# Patient Record
Sex: FEMALE | Race: WHITE | NOT HISPANIC OR LATINO | Employment: FULL TIME | URBAN - METROPOLITAN AREA
[De-identification: names, ages, dates, MRNs, and addresses within clinical notes are randomized per-mention and may not be internally consistent; named-entity substitution may affect disease eponyms.]

---

## 2017-02-25 ENCOUNTER — ALLSCRIPTS OFFICE VISIT (OUTPATIENT)
Dept: OTHER | Facility: OTHER | Age: 42
End: 2017-02-25

## 2017-02-25 ENCOUNTER — HOSPITAL ENCOUNTER (OUTPATIENT)
Dept: RADIOLOGY | Facility: CLINIC | Age: 42
Discharge: HOME | End: 2017-02-25
Payer: COMMERCIAL

## 2017-02-25 DIAGNOSIS — M25.579 PAIN IN ANKLE: ICD-10-CM

## 2017-02-25 PROCEDURE — 73610 X-RAY EXAM OF ANKLE: CPT

## 2017-09-14 ENCOUNTER — GENERIC CONVERSION - ENCOUNTER (OUTPATIENT)
Dept: OTHER | Facility: OTHER | Age: 42
End: 2017-09-14

## 2017-10-24 ENCOUNTER — GENERIC CONVERSION - ENCOUNTER (OUTPATIENT)
Dept: OTHER | Facility: OTHER | Age: 42
End: 2017-10-24

## 2018-01-13 VITALS
SYSTOLIC BLOOD PRESSURE: 118 MMHG | RESPIRATION RATE: 16 BRPM | HEIGHT: 63 IN | WEIGHT: 140 LBS | TEMPERATURE: 98.8 F | DIASTOLIC BLOOD PRESSURE: 70 MMHG | BODY MASS INDEX: 24.8 KG/M2 | HEART RATE: 78 BPM

## 2018-01-22 VITALS
HEART RATE: 62 BPM | TEMPERATURE: 98.4 F | DIASTOLIC BLOOD PRESSURE: 72 MMHG | OXYGEN SATURATION: 100 % | RESPIRATION RATE: 16 BRPM | SYSTOLIC BLOOD PRESSURE: 126 MMHG

## 2018-01-22 VITALS
SYSTOLIC BLOOD PRESSURE: 108 MMHG | TEMPERATURE: 98.8 F | OXYGEN SATURATION: 100 % | HEART RATE: 67 BPM | DIASTOLIC BLOOD PRESSURE: 70 MMHG | RESPIRATION RATE: 14 BRPM

## 2018-03-13 ENCOUNTER — OFFICE VISIT (OUTPATIENT)
Dept: URGENT CARE | Facility: CLINIC | Age: 43
End: 2018-03-13
Payer: COMMERCIAL

## 2018-03-13 VITALS
RESPIRATION RATE: 20 BRPM | DIASTOLIC BLOOD PRESSURE: 60 MMHG | HEART RATE: 69 BPM | OXYGEN SATURATION: 100 % | BODY MASS INDEX: 26.4 KG/M2 | HEIGHT: 63 IN | TEMPERATURE: 98.6 F | SYSTOLIC BLOOD PRESSURE: 128 MMHG | WEIGHT: 149 LBS

## 2018-03-13 DIAGNOSIS — J01.11 ACUTE RECURRENT FRONTAL SINUSITIS: Primary | ICD-10-CM

## 2018-03-13 PROCEDURE — 99213 OFFICE O/P EST LOW 20 MIN: CPT | Performed by: PHYSICIAN ASSISTANT

## 2018-03-13 RX ORDER — ESOMEPRAZOLE MAGNESIUM 40 MG/1
CAPSULE, DELAYED RELEASE ORAL
COMMUNITY
End: 2019-03-19 | Stop reason: SDUPTHER

## 2018-03-13 RX ORDER — OLOPATADINE HYDROCHLORIDE 665 UG/1
SPRAY NASAL
COMMUNITY

## 2018-03-13 RX ORDER — ATENOLOL 50 MG/1
TABLET ORAL
COMMUNITY
End: 2018-09-03 | Stop reason: SDUPTHER

## 2018-03-13 RX ORDER — AMOXICILLIN AND CLAVULANATE POTASSIUM 875; 125 MG/1; MG/1
1 TABLET, FILM COATED ORAL EVERY 12 HOURS SCHEDULED
Qty: 14 TABLET | Refills: 0 | Status: SHIPPED | OUTPATIENT
Start: 2018-03-13 | End: 2018-03-20

## 2018-03-13 NOTE — PATIENT INSTRUCTIONS
Sinusitis   AMBULATORY CARE:   Sinusitis  is inflammation or infection of your sinuses  It is most often caused by a virus  Acute sinusitis may last up to 12 weeks  Chronic sinusitis lasts longer than 12 weeks  Recurrent sinusitis means you have 4 or more times in 1 year  Common symptoms include the following:   · Fever    · Pain, pressure, redness, or swelling around the forehead, cheeks, or eyes    · Thick yellow or green discharge from your nose    · Tenderness when you touch your face over your sinuses    · Dry cough that happens mostly at night or when you lie down    · Headache and face pain that is worse when you lean forward    · Tooth pain, or pain when you chew  Seek care immediately if:   · Your eye and eyelid are red, swollen, and painful  · You cannot open your eye  · You have vision changes, such as double vision  · Your eyeball bulges out or you cannot move your eye  · You are more sleepy than normal, or you notice changes in your ability to think, move, or talk  · You have a stiff neck, a fever, or a bad headache  · You have swelling of your forehead or scalp  Contact your healthcare provider if:   · Your symptoms do not improve after 3 days  · Your symptoms do not go away after 10 days  · You have nausea and are vomiting  · Your nose is bleeding  · You have questions or concerns about your condition or care  Treatment for sinusitis:  Your symptoms may go away on their own  Your healthcare provider may recommend watchful waiting for up to 10 days before starting antibiotics  You may  need any of the following:  · Acetaminophen  decreases pain and fever  It is available without a doctor's order  Ask how much to take and how often to take it  Follow directions  Read the labels of all other medicines you are using to see if they also contain acetaminophen, or ask your doctor or pharmacist  Acetaminophen can cause liver damage if not taken correctly   Do not use more than 4 grams (4,000 milligrams) total of acetaminophen in one day  · NSAIDs , such as ibuprofen, help decrease swelling, pain, and fever  This medicine is available with or without a doctor's order  NSAIDs can cause stomach bleeding or kidney problems in certain people  If you take blood thinner medicine, always ask your healthcare provider if NSAIDs are safe for you  Always read the medicine label and follow directions  · Nasal steroid sprays  may help decrease inflammation in your nose and sinuses  · Decongestants  help reduce swelling and drain mucus in the nose and sinuses  They may help you breathe easier  · Antihistamines  help dry mucus in the nose and relieve sneezing  · Antibiotics  help treat or prevent a bacterial infection  · Take your medicine as directed  Contact your healthcare provider if you think your medicine is not helping or if you have side effects  Tell him or her if you are allergic to any medicine  Keep a list of the medicines, vitamins, and herbs you take  Include the amounts, and when and why you take them  Bring the list or the pill bottles to follow-up visits  Carry your medicine list with you in case of an emergency  Self-care:   · Rinse your sinuses  Use a sinus rinse device to rinse your nasal passages with a saline (salt water) solution or distilled water  Do not use tap water  This will help thin the mucus in your nose and rinse away pollen and dirt  It will also help reduce swelling so you can breathe normally  Ask your healthcare provider how often to do this  · Breathe in steam   Heat a bowl of water until you see steam  Lean over the bowl and make a tent over your head with a large towel  Breathe deeply for about 20 minutes  Be careful not to get too close to the steam or burn yourself  Do this 3 times a day  You can also breathe deeply when you take a hot shower  · Sleep with your head elevated    Place an extra pillow under your head before you go to sleep to help your sinuses drain  · Drink liquids as directed  Ask your healthcare provider how much liquid to drink each day and which liquids are best for you  Liquids will thin the mucus in your nose and help it drain  Avoid drinks that contain alcohol or caffeine  · Do not smoke, and avoid secondhand smoke  Nicotine and other chemicals in cigarettes and cigars can make your symptoms worse  Ask your healthcare provider for information if you currently smoke and need help to quit  E-cigarettes or smokeless tobacco still contain nicotine  Talk to your healthcare provider before you use these products  Prevent the spread of germs that cause sinusitis:  Wash your hands often with soap and water  Wash your hands after you use the bathroom, change a child's diaper, or sneeze  Wash your hands before you prepare or eat food  Follow up with your healthcare provider as directed: You may be referred to an ear, nose, and throat specialist  Write down your questions so you remember to ask them during your visits  © 2017 [employer]  Information is for End User's use only and may not be sold, redistributed or otherwise used for commercial purposes  All illustrations and images included in CareNotes® are the copyrighted property of [employer]  or [address]  The above information is an  only  It is not intended as medical advice for individual conditions or treatments  Talk to your doctor, nurse or pharmacist before following any medical regimen to see if it is safe and effective for you  Acute Sinusitis:   -Due to the severity of the patients symptoms and her hx of recurrent sinusitis will prescribe Augmentin 875mg taken as prescribed  -Continue taking Nasacort daily   -Stay well hydrated and rest  You can use a humidifier next to your bed  Take steam showers  -Take Advil or Tylenol for fever or pain  -You can take OTC Mucinex   You may try nasal rinses     -If your symptoms worsen or change follow up with your PCP immediately

## 2018-03-13 NOTE — PROGRESS NOTES
[facility]        NAME: Tristan Haney is a 36 y o  female  : 1975    MRN: [id number]  DATE: 2018  TIME: 4:48 PM    Assessment and Plan   Acute recurrent frontal sinusitis [J01 11]  1  Acute recurrent frontal sinusitis  amoxicillin-clavulanate (AUGMENTIN) 875-125 mg per tablet         Patient Instructions   Acute Sinusitis:   -Due to the severity of the patients symptoms and her hx of recurrent sinusitis will prescribe Augmentin 875mg taken as prescribed  -Continue taking Nasacort daily   -Stay well hydrated and rest  You can use a humidifier next to your bed  Take steam showers  -Take Advil or Tylenol for fever or pain  -You can take OTC Mucinex  You may try nasal rinses  -If your symptoms worsen or change follow up with your PCP immediately     Follow up with PCP in 3-5 days  Proceed to  ER if symptoms worsen  Chief Complaint     Chief Complaint   Patient presents with    Cold Like Symptoms     began 3 days ago: headache, congestion, post nasal drip   green mucous from nose  ice and ibuprofen w/o relief  does take allergy meds         History of Present Illness       The patient presents today for a three day hx of headache, PND, nasal congestion productive of a green mucous  She states that her symptoms started with sinus pressure and pain  She is now having severe left sided facial pain and pressure  She has been applying ice to the sinuses and has been taking Advil with no relief  She also states that she takes OTC allergy medications  She denies fever or chills  Review of Systems   Review of Systems   Constitutional: Negative for chills, diaphoresis, fatigue and fever  HENT: Positive for congestion, postnasal drip, rhinorrhea, sinus pain and sinus pressure  Negative for ear discharge, ear pain, sore throat, tinnitus, trouble swallowing and voice change  Respiratory: Negative for cough, shortness of breath and wheezing      Cardiovascular: Negative for chest pain and palpitations  Allergic/Immunologic: Positive for environmental allergies  Neurological: Positive for headaches  Negative for dizziness and light-headedness  Hematological: Negative for adenopathy  Current Medications       Current Outpatient Prescriptions:     atenolol (TENORMIN) 50 mg tablet, Take by mouth, Disp: , Rfl:     budesonide (PULMICORT FLEXHALER) 180 MCG/ACT inhaler, Inhale, Disp: , Rfl:     esomeprazole (NEXIUM) 40 MG capsule, Take by mouth, Disp: , Rfl:     Olopatadine HCl (PATANASE) 0 6 % SOLN, into each nostril, Disp: , Rfl:     amoxicillin-clavulanate (AUGMENTIN) 875-125 mg per tablet, Take 1 tablet by mouth every 12 (twelve) hours for 7 days, Disp: 14 tablet, Rfl: 0    Current Allergies     Allergies as of 2018 - Reviewed 2018   Allergen Reaction Noted    Corn oil Other (See Comments) 2012    Isoflavones Other (See Comments) 2012    Peanut oil Other (See Comments) 2012    Latex Rash 2012            The following portions of the patient's history were reviewed and updated as appropriate: allergies, current medications, past family history, past medical history, past social history, past surgical history and problem list      Past Medical History:   Diagnosis Date    Allergic rhinitis     Asthma     Mitral valve prolapse        Past Surgical History:   Procedure Laterality Date    APPENDECTOMY       SECTION         History reviewed  No pertinent family history  Medications have been verified  Objective   /60   Pulse 69   Temp 98 6 °F (37 °C)   Resp 20   Ht 5' 3" (1 6 m)   Wt 67 6 kg (149 lb)   SpO2 100%   BMI 26 39 kg/m²        Physical Exam     Physical Exam   Constitutional: She appears well-developed and well-nourished  No distress     HENT:   Right Ear: Hearing, tympanic membrane, external ear and ear canal normal    Left Ear: Hearing, tympanic membrane, external ear and ear canal normal  Nose: Mucosal edema and rhinorrhea present  Right sinus exhibits maxillary sinus tenderness and frontal sinus tenderness  Left sinus exhibits maxillary sinus tenderness and frontal sinus tenderness  Mouth/Throat: Uvula is midline, oropharynx is clear and moist and mucous membranes are normal    Neck: Normal range of motion  Neck supple  Cardiovascular: Normal rate and regular rhythm  Exam reveals no gallop and no friction rub  No murmur heard  Pulmonary/Chest: Effort normal and breath sounds normal  No respiratory distress  She has no rales  She exhibits no tenderness  Lymphadenopathy:     She has no cervical adenopathy  Neurological: She is alert  Skin: She is not diaphoretic  Psychiatric: She has a normal mood and affect

## 2018-06-15 LAB — HBA1C MFR BLD HPLC: 5 %

## 2018-06-20 LAB — HBA1C MFR BLD HPLC: 5 %

## 2018-07-11 ENCOUNTER — OFFICE VISIT (OUTPATIENT)
Dept: URGENT CARE | Facility: CLINIC | Age: 43
End: 2018-07-11
Payer: COMMERCIAL

## 2018-07-11 VITALS
RESPIRATION RATE: 16 BRPM | HEIGHT: 62 IN | DIASTOLIC BLOOD PRESSURE: 64 MMHG | OXYGEN SATURATION: 100 % | HEART RATE: 62 BPM | TEMPERATURE: 97.8 F | WEIGHT: 144.6 LBS | SYSTOLIC BLOOD PRESSURE: 100 MMHG | BODY MASS INDEX: 26.61 KG/M2

## 2018-07-11 DIAGNOSIS — H60.502 ACUTE OTITIS EXTERNA OF LEFT EAR, UNSPECIFIED TYPE: Primary | ICD-10-CM

## 2018-07-11 PROCEDURE — 99213 OFFICE O/P EST LOW 20 MIN: CPT | Performed by: FAMILY MEDICINE

## 2018-07-11 RX ORDER — MONTELUKAST SODIUM 10 MG/1
TABLET ORAL
COMMUNITY
Start: 2018-03-15 | End: 2019-07-11 | Stop reason: ALTCHOICE

## 2018-07-11 RX ORDER — LEVOTHYROXINE SODIUM 88 MCG
88 TABLET ORAL DAILY
Refills: 0 | COMMUNITY
Start: 2018-05-15

## 2018-07-11 NOTE — PROGRESS NOTES
[facility]        NAME: Mars Colon is a 36 y o  female  : 1975    MRN: [id number]  DATE: 2018  TIME: 2:55 PM    Assessment and Plan   Acute otitis externa of left ear, unspecified type [H60 502]  1  Acute otitis externa of left ear, unspecified type  neomycin-polymyxin-hydrocortisone (CORTISPORIN) otic solution         Patient Instructions     Patient Instructions   Mild acute otitis externa of the left ear   - Cortisporin ear drops prescribed, use as directed  - take Tylenol or Motrin as needed   - no swimming until conditions resolves  - if symptoms persist despite treatment or worsen, follow up w/ pcp for re-check     Follow up with PCP in 3-5 days  Proceed to  ER if symptoms worsen  Chief Complaint     Chief Complaint   Patient presents with    Earache         History of Present Illness       35 yo female presents c/o L ear pain x 1 day  Has been swimming recently  Denies any drainage from the ear  No URI sx  No fever/chills  No headache or dizziness  No hearing loss  No ringing in ears  Feels some left sided sinus pressure  Has been using OTC ear drops w/ minimal relief  Review of Systems   Review of Systems   Constitutional: Negative  HENT:        As noted in HPI   Eyes: Negative  Respiratory: Negative  Cardiovascular: Negative  Gastrointestinal: Negative  Musculoskeletal: Negative  Skin: Negative  Neurological: Negative            Current Medications       Current Outpatient Prescriptions:     atenolol (TENORMIN) 50 mg tablet, Take by mouth, Disp: , Rfl:     budesonide (PULMICORT FLEXHALER) 180 MCG/ACT inhaler, Inhale, Disp: , Rfl:     esomeprazole (NEXIUM) 40 MG capsule, Take by mouth, Disp: , Rfl:     montelukast (SINGULAIR) 10 mg tablet, take 1 tablet by mouth every evening, Disp: , Rfl:     Olopatadine HCl (PATANASE) 0 6 % SOLN, into each nostril, Disp: , Rfl:     SYNTHROID 88 MCG tablet, Take 88 mcg by mouth daily, Disp: , Rfl: 0    neomycin-polymyxin-hydrocortisone (CORTISPORIN) otic solution, Administer 4 drops into the left ear every 8 (eight) hours for 7 days, Disp: 10 mL, Rfl: 0    Current Allergies     Allergies as of 2018 - Reviewed 2018   Allergen Reaction Noted    Corn oil Other (See Comments) 2012    Isoflavones Other (See Comments) 2012    Latex Rash 2012    Peanut oil Other (See Comments) 2012            The following portions of the patient's history were reviewed and updated as appropriate: allergies, current medications, past family history, past medical history, past social history, past surgical history and problem list      Past Medical History:   Diagnosis Date    Allergic rhinitis     Asthma     Mitral valve prolapse        Past Surgical History:   Procedure Laterality Date    APPENDECTOMY       SECTION         Family History   Problem Relation Age of Onset    Emphysema Mother     Diabetes Father     Cancer Father          Medications have been verified  Objective   /64 (BP Location: Right arm, Patient Position: Sitting, Cuff Size: Standard)   Pulse 62   Temp 97 8 °F (36 6 °C) (Temporal)   Resp 16   Ht 5' 2" (1 575 m)   Wt 65 6 kg (144 lb 9 6 oz)   SpO2 100%   BMI 26 45 kg/m²        Physical Exam     Physical Exam   Constitutional: She is oriented to person, place, and time  Vital signs are normal  She appears well-developed and well-nourished  She is active and cooperative  Non-toxic appearance  She does not have a sickly appearance  She does not appear ill  No distress  HENT:   Head: Normocephalic and atraumatic  Right Ear: Hearing, tympanic membrane, external ear and ear canal normal    Left Ear: Tympanic membrane normal  There is tenderness  No drainage or swelling  No mastoid tenderness     Nose: Nose normal    Mouth/Throat: Uvula is midline, oropharynx is clear and moist and mucous membranes are normal    Left TM within normal limits  Left ear canal is mildly erythematous and tender  No drainage or swelling noted  Eyes: Conjunctivae and EOM are normal  Pupils are equal, round, and reactive to light  Neck: Normal range of motion  Neck supple  Lymphadenopathy:     She has no cervical adenopathy  Neurological: She is alert and oriented to person, place, and time  Skin: She is not diaphoretic  Psychiatric: She has a normal mood and affect  Her behavior is normal  Judgment and thought content normal    Nursing note and vitals reviewed

## 2018-07-11 NOTE — PATIENT INSTRUCTIONS
Mild acute otitis externa of the left ear   - Cortisporin ear drops prescribed, use as directed  - take Tylenol or Motrin as needed   - no swimming until conditions resolves  - if symptoms persist despite treatment or worsen, follow up w/ pcp for re-check

## 2018-09-03 ENCOUNTER — OFFICE VISIT (OUTPATIENT)
Dept: URGENT CARE | Facility: CLINIC | Age: 43
End: 2018-09-03
Payer: COMMERCIAL

## 2018-09-03 VITALS
HEART RATE: 62 BPM | SYSTOLIC BLOOD PRESSURE: 128 MMHG | BODY MASS INDEX: 27.42 KG/M2 | RESPIRATION RATE: 16 BRPM | OXYGEN SATURATION: 100 % | HEIGHT: 62 IN | DIASTOLIC BLOOD PRESSURE: 88 MMHG | WEIGHT: 149 LBS

## 2018-09-03 DIAGNOSIS — H92.01 RIGHT EAR PAIN: Primary | ICD-10-CM

## 2018-09-03 DIAGNOSIS — H91.91 HEARING LOSS OF RIGHT EAR, UNSPECIFIED HEARING LOSS TYPE: ICD-10-CM

## 2018-09-03 DIAGNOSIS — H93.11 TINNITUS OF RIGHT EAR: ICD-10-CM

## 2018-09-03 PROCEDURE — 99213 OFFICE O/P EST LOW 20 MIN: CPT | Performed by: PHYSICIAN ASSISTANT

## 2018-09-03 RX ORDER — PREDNISONE 10 MG/1
TABLET ORAL
Refills: 0 | COMMUNITY
Start: 2018-08-31 | End: 2019-07-11 | Stop reason: ALTCHOICE

## 2018-09-03 RX ORDER — ATENOLOL 25 MG/1
TABLET ORAL
Refills: 0 | COMMUNITY
Start: 2018-07-17 | End: 2019-07-11 | Stop reason: SDUPTHER

## 2018-09-03 NOTE — PROGRESS NOTES
[facility]        NAME: Mir Ferguson is a 36 y o  female  : 1975    MRN: [id number]  DATE: September 3, 2018  TIME: 12:20 PM    Assessment and Plan   Right ear pain [H92 01]  1  Right ear pain     2  Tinnitus of right ear     3  Hearing loss of right ear, unspecified hearing loss type       Patient Instructions   Follow up with your ENT tomorrow to have ear reevaluated  Continue taking the antibiotic prescribed, as directed  Take this medication with food  Take a probiotic with this medication  Proceed to the ER if symptoms worsen  Reviewed with patient no findings of infection and good wound healing on exam  She was in agreement to seek care immediately tomorrow morning  She was provided with an [employer] Card to establish care with a PCP so they may coordinate care with ENT  All questions answered  Precautions given  Chief Complaint     Chief Complaint   Patient presents with   Rachelle Aguirre     pt states she had a right ear concussion; onset 3 weeks ago; ringing in the ear; worsening ; hearing loss as per ENT;  went back to ENT on Friday, received an steroid injection into right ear; pain increased on   History of Present Illness   35 y/o female presenting with c/o right ear pain x 3 weeks  Patient states she [place] 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[place] 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  She was later seen by an ENT due to "ear concussion"  She was treated at onset with an oral steroid which did help to relieve some hearing loss and ringing in her ears, however, symptoms recurred following d/c medication  She was seen 3 days ago by same ENT where a transtympanic steroid injection was performed  The injection did somewhat relieve discomfort, however, symptoms recurred yesterday with worsened severity  Patient states she spoke with the ENT on call yesterday who advised her to begin nasal spray and a decongestant   She reported to said provider that she was already using nasal sprays and inability to take a decongestant due to MVP and subsequent palpitations with taking these medications  She was thus started on augment, which she has taken x 1 day  She is presenting today wanting to have ear looked at to ensure no worsening, rupture, or infection  Review of Systems   Review of Systems   Constitutional: Negative for appetite change, chills, diaphoresis, fatigue and fever  HENT: Negative for congestion, ear discharge, rhinorrhea and sore throat  Respiratory: Negative for cough, shortness of breath and wheezing  Cardiovascular: Negative for chest pain and palpitations  Gastrointestinal: Negative for abdominal pain, diarrhea, nausea and vomiting  Musculoskeletal: Negative for arthralgias and myalgias  Skin: Negative for color change and rash  Neurological: Negative for dizziness, light-headedness and headaches  Current Medications       Current Outpatient Prescriptions:     atenolol (TENORMIN) 25 mg tablet, take 2 tablets by mouth twice a day AT LUNCH AND BEDTIME, Disp: , Rfl: 0    esomeprazole (NEXIUM) 40 MG capsule, Take by mouth, Disp: , Rfl:     montelukast (SINGULAIR) 10 mg tablet, take 1 tablet by mouth every evening, Disp: , Rfl:     Olopatadine HCl (PATANASE) 0 6 % SOLN, into each nostril, Disp: , Rfl:     predniSONE 10 mg tablet, TAKE 4 TABLETS DAILY FOR 2 DAYS, THEN 3 DAILY FOR 2 DAYS, THEN 2    (REFER TO PRESCRIPTION NOTES)  , Disp: , Rfl: 0    SYNTHROID 88 MCG tablet, Take 88 mcg by mouth daily, Disp: , Rfl: 0    budesonide (PULMICORT FLEXHALER) 180 MCG/ACT inhaler, Inhale, Disp: , Rfl:     Current Allergies     Allergies as of 09/03/2018 - Reviewed 09/03/2018   Allergen Reaction Noted    Corn oil Other (See Comments) 12/30/2012    Isoflavones Other (See Comments) 12/30/2012    Latex Rash 12/30/2012    Peanut oil Other (See Comments) 12/30/2012            The following portions of the patient's history were reviewed and updated as appropriate: allergies, current medications, past family history, past medical history, past social history, past surgical history and problem list      Past Medical History:   Diagnosis Date    Allergic rhinitis     Asthma     Mitral valve prolapse        Past Surgical History:   Procedure Laterality Date    APPENDECTOMY       SECTION         Family History   Problem Relation Age of Onset    Emphysema Mother     Diabetes Father     Cancer Father          Medications have been verified  Objective   /88   Pulse 62   Resp 16   Ht 5' 2" (1 575 m)   Wt 67 6 kg (149 lb)   LMP 2018 (Exact Date)   SpO2 100%   Breastfeeding? No   BMI 27 25 kg/m²        Physical Exam     Physical Exam   Constitutional: She is oriented to person, place, and time  She appears well-developed and well-nourished  She does not appear ill  No distress  HENT:   Head: Normocephalic and atraumatic  Right Ear: External ear and ear canal normal  Decreased hearing is noted  Left Ear: Hearing, tympanic membrane, external ear and ear canal normal    Nose: Nose normal  No mucosal edema or rhinorrhea  Mouth/Throat: Uvula is midline, oropharynx is clear and moist and mucous membranes are normal  No oropharyngeal exudate, posterior oropharyngeal edema or posterior oropharyngeal erythema  Signs of healing consistent with recent transtympanic steroid injection visualized at 11 oclock region of the right TM  Eyes: Conjunctivae are normal    Neck: Neck supple  Cardiovascular: Normal rate, regular rhythm and normal heart sounds  Pulmonary/Chest: Effort normal and breath sounds normal  No respiratory distress  She has no decreased breath sounds  She has no wheezes  She has no rhonchi  She has no rales  Lymphadenopathy:     She has no cervical adenopathy  Neurological: She is alert and oriented to person, place, and time  No cranial nerve deficit  She exhibits normal muscle tone  Coordination normal    Skin: Skin is warm and dry  No rash noted  She is not diaphoretic  Psychiatric: She has a normal mood and affect  Her behavior is normal    Nursing note and vitals reviewed

## 2018-09-03 NOTE — PATIENT INSTRUCTIONS
Follow up with your ENT tomorrow to have ear reevaluated  Continue taking the antibiotic prescribed, as directed  Take this medication with food  Take a probiotic with this medication  Proceed to the ER if symptoms worsen

## 2019-03-19 DIAGNOSIS — K21.9 GASTROESOPHAGEAL REFLUX DISEASE, ESOPHAGITIS PRESENCE NOT SPECIFIED: Primary | ICD-10-CM

## 2019-03-20 RX ORDER — ESOMEPRAZOLE MAGNESIUM 40 MG/1
CAPSULE, DELAYED RELEASE ORAL
Qty: 90 CAPSULE | Refills: 0 | Status: SHIPPED | OUTPATIENT
Start: 2019-03-20 | End: 2019-06-12 | Stop reason: SDUPTHER

## 2019-06-12 DIAGNOSIS — K21.9 GASTROESOPHAGEAL REFLUX DISEASE, ESOPHAGITIS PRESENCE NOT SPECIFIED: ICD-10-CM

## 2019-06-12 RX ORDER — ESOMEPRAZOLE MAGNESIUM 40 MG/1
CAPSULE, DELAYED RELEASE ORAL
Qty: 90 CAPSULE | Refills: 0 | Status: SHIPPED | OUTPATIENT
Start: 2019-06-12 | End: 2019-09-10 | Stop reason: SDUPTHER

## 2019-07-11 ENCOUNTER — OFFICE VISIT (OUTPATIENT)
Dept: URGENT CARE | Facility: CLINIC | Age: 44
End: 2019-07-11
Payer: COMMERCIAL

## 2019-07-11 VITALS
BODY MASS INDEX: 27.25 KG/M2 | TEMPERATURE: 98.9 F | DIASTOLIC BLOOD PRESSURE: 88 MMHG | OXYGEN SATURATION: 100 % | WEIGHT: 149 LBS | SYSTOLIC BLOOD PRESSURE: 120 MMHG | RESPIRATION RATE: 16 BRPM | HEART RATE: 69 BPM

## 2019-07-11 DIAGNOSIS — R39.9 UTI SYMPTOMS: Primary | ICD-10-CM

## 2019-07-11 LAB
SL AMB  POCT GLUCOSE, UA: ABNORMAL
SL AMB LEUKOCYTE ESTERASE,UA: ABNORMAL
SL AMB POCT BILIRUBIN,UA: ABNORMAL
SL AMB POCT BLOOD,UA: ABNORMAL
SL AMB POCT CLARITY,UA: ABNORMAL
SL AMB POCT COLOR,UA: ABNORMAL
SL AMB POCT KETONES,UA: ABNORMAL
SL AMB POCT NITRITE,UA: ABNORMAL
SL AMB POCT PH,UA: ABNORMAL
SL AMB POCT SPECIFIC GRAVITY,UA: ABNORMAL
SL AMB POCT URINE PROTEIN: ABNORMAL
SL AMB POCT UROBILINOGEN: ABNORMAL

## 2019-07-11 PROCEDURE — 99213 OFFICE O/P EST LOW 20 MIN: CPT | Performed by: PHYSICIAN ASSISTANT

## 2019-07-11 PROCEDURE — 87086 URINE CULTURE/COLONY COUNT: CPT | Performed by: PHYSICIAN ASSISTANT

## 2019-07-11 PROCEDURE — 81002 URINALYSIS NONAUTO W/O SCOPE: CPT | Performed by: PHYSICIAN ASSISTANT

## 2019-07-11 RX ORDER — ATENOLOL 25 MG/1
50 TABLET ORAL
COMMUNITY
Start: 2018-11-27

## 2019-07-11 RX ORDER — NITROFURANTOIN 25; 75 MG/1; MG/1
100 CAPSULE ORAL 2 TIMES DAILY
Qty: 14 CAPSULE | Refills: 0 | Status: SHIPPED | OUTPATIENT
Start: 2019-07-11 | End: 2019-07-29

## 2019-07-11 NOTE — PATIENT INSTRUCTIONS
Urinary Tract Infection in Women   WHAT YOU NEED TO KNOW:   A urinary tract infection (UTI) is caused by bacteria that get inside your urinary tract  Most bacteria that enter your urinary tract come out when you urinate  If the bacteria stay in your urinary tract, you may get an infection  Your urinary tract includes your kidneys, ureters, bladder, and urethra  Urine is made in your kidneys, and it flows from the ureters to the bladder  Urine leaves the bladder through the urethra  A UTI is more common in your lower urinary tract, which includes your bladder and urethra  DISCHARGE INSTRUCTIONS:   Return to the emergency department if:   · You are urinating very little or not at all  · You have a high fever with shaking chills  · You have side or back pain that gets worse  Contact your healthcare provider if:   · You have a fever  · You do not feel better after 2 days of taking antibiotics  · You are vomiting  · You have questions or concerns about your condition or care  Medicines:   · Antibiotics  help fight a bacterial infection  · Medicines  may be given to decrease pain and burning when you urinate  They will also help decrease the feeling that you need to urinate often  These medicines will make your urine orange or red  · Take your medicine as directed  Contact your healthcare provider if you think your medicine is not helping or if you have side effects  Tell him or her if you are allergic to any medicine  Keep a list of the medicines, vitamins, and herbs you take  Include the amounts, and when and why you take them  Bring the list or the pill bottles to follow-up visits  Carry your medicine list with you in case of an emergency  Follow up with your healthcare provider as directed:  Write down your questions so you remember to ask them during your visits  Prevent another UTI:   · Empty your bladder often  Urinate and empty your bladder as soon as you feel the need   Do not hold your urine for long periods of time  · Wipe from front to back after you urinate or have a bowel movement  This will help prevent germs from getting into your urinary tract through your urethra  · Drink liquids as directed  Ask how much liquid to drink each day and which liquids are best for you  You may need to drink more liquids than usual to help flush out the bacteria  Do not drink alcohol, caffeine, or citrus juices  These can irritate your bladder and increase your symptoms  Your healthcare provider may recommend cranberry juice to help prevent a UTI  · Urinate after you have sex  This can help flush out bacteria passed during sex  · Do not douche or use feminine deodorants  These can change the chemical balance in your vagina  · Change sanitary pads or tampons often  This will help prevent germs from getting into your urinary tract  · Do pelvic muscle exercises often  Pelvic muscle exercises may help you start and stop urinating  Strong pelvic muscles may help you empty your bladder easier  Squeeze these muscles tightly for 5 seconds like you are trying to hold back urine  Then relax for 5 seconds  Gradually work up to squeezing for 10 seconds  Do 3 sets of 15 repetitions a day, or as directed  © 2017 [employer]  Information is for End User's use only and may not be sold, redistributed or otherwise used for commercial purposes  All illustrations and images included in CareNotes® are the copyrighted property of [employer]  or [employer]  The above information is an  only  It is not intended as medical advice for individual conditions or treatments  Talk to your doctor, nurse or pharmacist before following any medical regimen to see if it is safe and effective for you  UTI symptoms:   -The Urinalysis was unable to be read due to azo  Will send out for culture  Will treat empirically with Macrobid 100mg taken as directed   Take with food and a probiotic  Call in the next 48 hours for your results    -Stay very well hydrated and push fluids  You can drink low sugar cranberry juice diluted with water    -You can take Uricalm or Azo for your symptoms not to exceed 48 hours  -If your symptoms worsen or persist follow up with your PCP immediately for a recheck

## 2019-07-11 NOTE — PROGRESS NOTES
[facility]        NAME: Jun Goldman is a 39 y o  female  : 1975    MRN: [id number]  DATE: 2019  TIME: 9:16 AM    Assessment and Plan   UTI symptoms [R39 9]  1  UTI symptoms  POCT urine dip         Patient Instructions   UTI symptoms:   -The Urinalysis was unable to be read due to azo  Will send out for culture  Will treat empirically with Macrobid 100mg taken as directed  Take with food and a probiotic  Call in the next 48 hours for your results    -Stay very well hydrated and push fluids  You can drink low sugar cranberry juice diluted with water    -You can take Uricalm or Azo for your symptoms not to exceed 48 hours  -If your symptoms worsen or persist follow up with your PCP immediately for a recheck  Follow up with PCP in 3-5 days  Proceed to  ER if symptoms worsen  Chief Complaint     Chief Complaint   Patient presents with    Possible UTI     pt presnets with painful urination, pressure, states started 2-3 days ago; took AZO last night         History of Present Illness       The patient presents today for a three day hx of dysuria, suprapubic pressure and urgency/frequency  She states that she took Azo before bed last night which did relieve the pain  She denies fever, chills, hematuria, abdominal pain, flank pain  She states that her last menses was 2019, she states that her menses have been normal        Review of Systems   Review of Systems   Constitutional: Negative for chills, fatigue and fever  Respiratory: Negative for chest tightness and shortness of breath  Gastrointestinal: Negative for abdominal distention, abdominal pain, blood in stool, constipation, diarrhea, nausea and vomiting  Endocrine: Negative for polyuria  Genitourinary: Positive for dysuria, frequency and urgency   Negative for decreased urine volume, difficulty urinating, flank pain, genital sores, hematuria, menstrual problem, pelvic pain, vaginal bleeding, vaginal discharge and vaginal pain  Skin: Negative for rash  Allergic/Immunologic: Negative for immunocompromised state  Neurological: Negative for dizziness, weakness, light-headedness, numbness and headaches  Hematological: Negative for adenopathy  Does not bruise/bleed easily  Current Medications       Current Outpatient Medications:     atenolol (TENORMIN) 25 mg tablet, Take 50 mg by mouth, Disp: , Rfl:     esomeprazole (NexIUM) 40 MG capsule, take 1 capsule by mouth once daily, Disp: 90 capsule, Rfl: 0    Olopatadine HCl (PATANASE) 0 6 % SOLN, into each nostril, Disp: , Rfl:     SYNTHROID 88 MCG tablet, Take 88 mcg by mouth daily, Disp: , Rfl: 0    Current Allergies     Allergies as of 2019 - Reviewed 2019   Allergen Reaction Noted    Corn oil Other (See Comments) 2012    Isoflavones Other (See Comments) 2012    Latex Rash 2012    Peanut oil Other (See Comments) 2012            The following portions of the patient's history were reviewed and updated as appropriate: allergies, current medications, past family history, past medical history, past social history, past surgical history and problem list      Past Medical History:   Diagnosis Date    Allergic rhinitis     Asthma     Mitral valve prolapse        Past Surgical History:   Procedure Laterality Date    APPENDECTOMY       SECTION         Family History   Problem Relation Age of Onset    Emphysema Mother     Diabetes Father     Cancer Father          Medications have been verified  Objective   /88   Pulse 69   Temp 98 9 °F (37 2 °C)   Resp 16   Wt 67 6 kg (149 lb)   LMP 2019   SpO2 100%   Breastfeeding? No   BMI 27 25 kg/m²        Physical Exam     Physical Exam   Constitutional: She appears well-developed and well-nourished  No distress  Cardiovascular: Normal rate, regular rhythm and normal heart sounds     Pulmonary/Chest: Effort normal and breath sounds normal    Abdominal: Soft  Normal appearance and bowel sounds are normal  She exhibits no distension  There is no hepatosplenomegaly  There is no tenderness  There is no rigidity, no rebound, no guarding, no CVA tenderness, no tenderness at McBurney's point and negative Barker's sign  No hernia  Skin: She is not diaphoretic  Psychiatric: She has a normal mood and affect  Her behavior is normal    Nursing note and vitals reviewed

## 2019-07-12 LAB — BACTERIA UR CULT: NORMAL

## 2019-07-26 RX ORDER — ATENOLOL 25 MG/1
TABLET ORAL
COMMUNITY
Start: 2019-07-22 | End: 2019-07-29

## 2019-07-29 ENCOUNTER — OFFICE VISIT (OUTPATIENT)
Dept: FAMILY MEDICINE CLINIC | Facility: CLINIC | Age: 44
End: 2019-07-29
Payer: COMMERCIAL

## 2019-07-29 VITALS
WEIGHT: 150 LBS | RESPIRATION RATE: 16 BRPM | TEMPERATURE: 98.4 F | DIASTOLIC BLOOD PRESSURE: 78 MMHG | BODY MASS INDEX: 27.6 KG/M2 | HEIGHT: 62 IN | HEART RATE: 70 BPM | SYSTOLIC BLOOD PRESSURE: 130 MMHG

## 2019-07-29 DIAGNOSIS — D50.9 IRON DEFICIENCY ANEMIA, UNSPECIFIED IRON DEFICIENCY ANEMIA TYPE: ICD-10-CM

## 2019-07-29 DIAGNOSIS — I05.9 MITRAL VALVE DISORDER: ICD-10-CM

## 2019-07-29 DIAGNOSIS — E66.3 OVERWEIGHT (BMI 25.0-29.9): ICD-10-CM
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
## 2019-07-29 DIAGNOSIS — E06.3 HASHIMOTO'S THYROIDITIS: ICD-10-CM

## 2019-07-29 DIAGNOSIS — Z00.01 ENCOUNTER FOR WELL ADULT EXAM WITH ABNORMAL FINDINGS: Primary | ICD-10-CM

## 2019-07-29 DIAGNOSIS — Z23 ENCOUNTER FOR IMMUNIZATION: ICD-10-CM

## 2019-07-29 PROCEDURE — 99396 PREV VISIT EST AGE 40-64: CPT | Performed by: FAMILY MEDICINE

## 2019-07-29 PROCEDURE — 90471 IMMUNIZATION ADMIN: CPT | Performed by: FAMILY MEDICINE

## 2019-07-29 PROCEDURE — 90714 TD VACC NO PRESV 7 YRS+ IM: CPT | Performed by: FAMILY MEDICINE

## 2019-07-29 NOTE — PROGRESS NOTES
FAMILY [facility] HEALTH MAINTENANCE OFFICE VISIT  [facility]    NAME: Alex Arana  AGE: 39 y o  SEX: female  : 1975     DATE: 2019    Assessment and Plan     Problem List Items Addressed This Visit        Endocrine    Hashimoto's thyroiditis    Relevant Orders    TSH, 3rd generation with Free T4 reflex       Cardiovascular and Mediastinum    Mitral valve disorder       Other    Iron deficiency anemia    Relevant Orders    CBC and differential    Iron, TIBC and Ferritin Panel      Other Visit Diagnoses     Encounter for well adult exam with abnormal findings    -  Primary    Relevant Orders    Lipid Panel with Direct LDL reflex    HEMOGLOBIN A1C W/ EAG ESTIMATION    Encounter for immunization        Relevant Orders    TD VACCINE GREATER THAN OR EQUAL TO 6YO PRESERVATIVE FREE IM (Completed)    Overweight (BMI 25 0-29  9)        Relevant Orders    Lipid Panel with Direct LDL reflex    HEMOGLOBIN A1C W/ EAG ESTIMATION    Basic metabolic panel    Tick bite, initial encounter        Relevant Orders    Lyme Antibody Profile with reflex to WB        · Patient Counseling:   · Nutrition: Stressed importance of a well balanced diet, moderation of sodium/saturated fat, caloric balance and sufficient intake of fiber  · Exercise: Stressed the importance of regular exercise with a goal of 150 minutes per week  · Dental Health: Discussed daily flossing and brushing and regular dental visits   · Injury Prevention: Discussed Safety Belts, Safety Helmets, and Smoke Detectors  · Immunizations reviewed received Td today as pertussis can not be given as per patient due to allergies  · Discussed benefits of screening up to date  · Discussed the patient's BMI with her  The BMI is above average; BMI management plan is completed     No follow-ups on file          Chief Complaint     Chief Complaint   Patient presents with    Establish Care       History of Present Illness     HPI    Well Adult Physical   Patient here for a comprehensive physical exam  Patient has a history of Hashimotos's,  Tinnitus, and Mitral valve prolapse presents to establish care  Patient states she has followed up with specialists but not with general practice  Patient follows up with cardiology in [facility] for mitral valve disorder  Last blood work was done over one year ago which showed TSH of 1 9 wnl, A1c of 5 0, and Lipid panel wnl  Blood work also showed hemoglobin of 9 9 and currently not on any medication prescribed supplements  She was taking flinestones with iron in them but is not at this moment  Patient complains of a deer tick bite in May  She knew because she has to pull it off her right forearm  She has not noticed any rashes, arthralgias, or problems with eyes including closure  For tinnitus, patient follows up with ENT  No concerns with any chest pain  Diet and Physical Activity  Diet: well balanced diet  Weight concerns: Patient is overweight (BMI 25 0-29 9), She states she tries to eat healthy, but home situation with her daughter being anorexic is hard as she wants to be a good example and show the importance of nutrition and eating  Exercise: runs around with kids, bike riding       Depression Screen  PHQ-9 Depression Screening    PHQ-9:    Frequency of the following problems over the past two weeks:       Little interest or pleasure in doing things:  0 - not at all  Feeling down, depressed, or hopeless:  0 - not at all  PHQ-2 Score:  0          General Health  Hearing: Slighty decreased: right, tinnutis: hearing loss, r ears, f/u ENT  Vision: no vision problems and most recent eye exam >1 year  Dental: regular dental visits, brushes teeth twice daily and flosses teeth occasionally    Reproductive Health  , 2 kids, one adopted     LMP: 19  Regular menses   Last pap smear: 2019, no history of abnormal pap smears  Not sexually active      The following portions of the patient's history were reviewed and updated as appropriate: allergies, current medications, past family history, past medical history, past social history, past surgical history and problem list     Review of Systems     Review of Systems   Constitutional: Negative for appetite change and fever  HENT: Negative for ear pain and sore throat  Eyes: Negative for visual disturbance  Respiratory: Negative for shortness of breath  Cardiovascular: Negative for chest pain and leg swelling  Gastrointestinal: Negative for abdominal pain, diarrhea, nausea and vomiting  Genitourinary: Negative for difficulty urinating, flank pain, hematuria and urgency  Musculoskeletal: Negative for arthralgias  Skin: Negative for color change and rash  Neurological: Negative for dizziness, tremors, light-headedness and headaches  Psychiatric/Behavioral: Negative for agitation and behavioral problems         Past Medical History     Past Medical History:   Diagnosis Date    Allergic rhinitis     Asthma     Hypothyroidism     Resolved 2017     Mitral valve prolapse     Postoperative hypothyroidism        Past Surgical History     Past Surgical History:   Procedure Laterality Date    APPENDECTOMY       SECTION         Social History     Social History     Socioeconomic History    Marital status: /Civil Union     Spouse name: None    Number of children: None    Years of education: None    Highest education level: None   Occupational History    None   Social Needs    Financial resource strain: None    Food insecurity:     Worry: None     Inability: None    Transportation needs:     Medical: None     Non-medical: None   Tobacco Use    Smoking status: Never Smoker    Smokeless tobacco: Never Used   Substance and Sexual Activity    Alcohol use: Yes     Frequency: 2-3 times a week     Drinks per session: 1 or 2     Binge frequency: Never     Comment: social    Drug use: No    Sexual activity: None   Lifestyle    Physical activity:     Days per week: None     Minutes per session: None    Stress: None   Relationships    Social connections:     Talks on phone: None     Gets together: None     Attends [employer] service: None     Active member of club or organization: None     Attends meetings of clubs or organizations: None     Relationship status: None    Intimate partner violence:     Fear of current or ex partner: None     Emotionally abused: None     Physically abused: None     Forced sexual activity: None   Other Topics Concern    None   Social History Narrative    None       Family History     Family History   Problem Relation Age of Onset    Emphysema Mother     Diabetes Father     Cancer Father        Current Medications       Current Outpatient Medications:     atenolol (TENORMIN) 25 mg tablet, Take 50 mg by mouth, Disp: , Rfl:     esomeprazole (NexIUM) 40 MG capsule, take 1 capsule by mouth once daily, Disp: 90 capsule, Rfl: 0    Olopatadine HCl (PATANASE) 0 6 % SOLN, into each nostril, Disp: , Rfl:     SYNTHROID 88 MCG tablet, Take 88 mcg by mouth daily, Disp: , Rfl: 0     Allergies     Allergies   Allergen Reactions    Isoflavones Other (See Comments)     dizzy    Latex Rash    Peanut Oil Other (See Comments)     dizzy       Objective     /78 (BP Location: Left arm, Patient Position: Sitting, Cuff Size: Standard)   Pulse 70   Temp 98 4 °F (36 9 °C)   Resp 16   Ht 5' 2" (1 575 m)   Wt 68 kg (150 lb)   LMP 07/02/2019   BMI 27 44 kg/m²      Physical Exam   Constitutional: She is oriented to person, place, and time  She appears well-developed and well-nourished  No distress  HENT:   Head: Normocephalic and atraumatic  Right Ear: External ear normal    Left Ear: External ear normal    Nose: Nose normal    Mouth/Throat: Oropharynx is clear and moist  No oropharyngeal exudate  Eyes: EOM are normal  Right eye exhibits no discharge   Left eye exhibits no discharge  Neck: Normal range of motion  Neck supple  Cardiovascular: Normal rate, regular rhythm, normal heart sounds and intact distal pulses  No murmur heard  Pulmonary/Chest: Effort normal and breath sounds normal  No respiratory distress  Abdominal: Soft  Bowel sounds are normal  She exhibits no distension  There is no tenderness  Musculoskeletal: Normal range of motion  She exhibits no edema  Neurological: She is alert and oriented to person, place, and time  Skin: Skin is warm  Psychiatric: She has a normal mood and affect   Her behavior is normal          No exam data present    Health Maintenance     Health Maintenance   Topic Date Due    BMI: Followup Plan  10/25/1992    DTaP,Tdap,and Td Vaccines (1 - Tdap) 10/25/1995    INFLUENZA VACCINE  09/26/2019 (Originally 7/1/2019)    MAMMOGRAM  07/29/2020 (Originally 1975)    Depression Screening PHQ  07/29/2020    BMI: Adult  07/29/2020    Pneumococcal Vaccine: 65+ Years (1 of 2 - PCV13) 10/25/2039    Pneumococcal Vaccine: Pediatrics (0 to 5 Years) and At-Risk Patients (6 to 59 Years)  Aged Out    HEPATITIS B VACCINES  Aged [employer] History   Administered Date(s) Administered    TD (adult) Preservative Free 07/29/2019       Isaiah Meraz MD  [address]

## 2019-09-10 DIAGNOSIS — K21.9 GASTROESOPHAGEAL REFLUX DISEASE, ESOPHAGITIS PRESENCE NOT SPECIFIED: ICD-10-CM

## 2019-09-11 RX ORDER — ESOMEPRAZOLE MAGNESIUM 40 MG/1
CAPSULE, DELAYED RELEASE ORAL
Qty: 90 CAPSULE | Refills: 0 | Status: SHIPPED | OUTPATIENT
Start: 2019-09-11 | End: 2019-12-10 | Stop reason: SDUPTHER

## 2019-12-10 DIAGNOSIS — K21.9 GASTROESOPHAGEAL REFLUX DISEASE, ESOPHAGITIS PRESENCE NOT SPECIFIED: ICD-10-CM

## 2019-12-13 RX ORDER — ESOMEPRAZOLE MAGNESIUM 40 MG/1
CAPSULE, DELAYED RELEASE ORAL
Qty: 90 CAPSULE | Refills: 0 | Status: SHIPPED | OUTPATIENT
Start: 2019-12-13 | End: 2020-03-07

## 2020-03-06 DIAGNOSIS — K21.9 GASTROESOPHAGEAL REFLUX DISEASE, ESOPHAGITIS PRESENCE NOT SPECIFIED: ICD-10-CM

## 2020-03-07 RX ORDER — ESOMEPRAZOLE MAGNESIUM 40 MG/1
CAPSULE, DELAYED RELEASE ORAL
Qty: 90 CAPSULE | Refills: 5 | Status: SHIPPED | OUTPATIENT
Start: 2020-03-07 | End: 2021-05-27

## 2020-07-30 LAB
B BURGDOR IGG+IGM SER-ACNC: <0.91 ISR (ref 0–0.9)
B BURGDOR IGM SER IA-ACNC: <0.8 INDEX (ref 0–0.79)
BASOPHILS # BLD AUTO: 0.1 X10E3/UL (ref 0–0.2)
BASOPHILS NFR BLD AUTO: 1 %
BUN SERPL-MCNC: 12 MG/DL (ref 6–24)
BUN/CREAT SERPL: 17 (ref 9–23)
CALCIUM SERPL-MCNC: 10.1 MG/DL (ref 8.7–10.2)
CHLORIDE SERPL-SCNC: 100 MMOL/L (ref 96–106)
CHOLEST SERPL-MCNC: 165 MG/DL (ref 100–199)
CO2 SERPL-SCNC: 24 MMOL/L (ref 20–29)
CREAT SERPL-MCNC: 0.72 MG/DL (ref 0.57–1)
EOSINOPHIL # BLD AUTO: 0.1 X10E3/UL (ref 0–0.4)
EOSINOPHIL NFR BLD AUTO: 2 %
ERYTHROCYTE [DISTWIDTH] IN BLOOD BY AUTOMATED COUNT: 12.7 % (ref 11.7–15.4)
EST. AVERAGE GLUCOSE BLD GHB EST-MCNC: 97 MG/DL
FERRITIN SERPL-MCNC: 14 NG/ML (ref 15–150)
GLUCOSE SERPL-MCNC: 82 MG/DL (ref 65–99)
HBA1C MFR BLD: 5 % (ref 4.8–5.6)
HCT VFR BLD AUTO: 40.6 % (ref 34–46.6)
HDLC SERPL-MCNC: 65 MG/DL
HGB BLD-MCNC: 13.5 G/DL (ref 11.1–15.9)
IMM GRANULOCYTES # BLD: 0 X10E3/UL (ref 0–0.1)
IMM GRANULOCYTES NFR BLD: 0 %
IRON SATN MFR SERPL: 23 % (ref 15–55)
IRON SERPL-MCNC: 95 UG/DL (ref 27–159)
LDLC SERPL CALC-MCNC: 85 MG/DL (ref 0–99)
LYMPHOCYTES # BLD AUTO: 1.4 X10E3/UL (ref 0.7–3.1)
LYMPHOCYTES NFR BLD AUTO: 20 %
MCH RBC QN AUTO: 29.7 PG (ref 26.6–33)
MCHC RBC AUTO-ENTMCNC: 33.3 G/DL (ref 31.5–35.7)
MCV RBC AUTO: 89 FL (ref 79–97)
MICRODELETION SYND BLD/T FISH: NORMAL
MONOCYTES # BLD AUTO: 0.7 X10E3/UL (ref 0.1–0.9)
MONOCYTES NFR BLD AUTO: 10 %
NEUTROPHILS # BLD AUTO: 4.5 X10E3/UL (ref 1.4–7)
NEUTROPHILS NFR BLD AUTO: 67 %
PLATELET # BLD AUTO: 286 X10E3/UL (ref 150–450)
POTASSIUM SERPL-SCNC: 4.4 MMOL/L (ref 3.5–5.2)
RBC # BLD AUTO: 4.54 X10E6/UL (ref 3.77–5.28)
SL AMB EGFR AFRICAN AMERICAN: 117 ML/MIN/1.73
SL AMB EGFR NON AFRICAN AMERICAN: 101 ML/MIN/1.73
SODIUM SERPL-SCNC: 140 MMOL/L (ref 134–144)
TIBC SERPL-MCNC: 409 UG/DL (ref 250–450)
TRIGL SERPL-MCNC: 73 MG/DL (ref 0–149)
TSH SERPL DL<=0.005 MIU/L-ACNC: 1.43 UIU/ML (ref 0.45–4.5)
UIBC SERPL-MCNC: 314 UG/DL (ref 131–425)
WBC # BLD AUTO: 6.8 X10E3/UL (ref 3.4–10.8)

## 2020-08-03 ENCOUNTER — TELEPHONE (OUTPATIENT)
Dept: GASTROENTEROLOGY | Facility: CLINIC | Age: 45
End: 2020-08-03

## 2020-08-03 NOTE — TELEPHONE ENCOUNTER
Dr Berlin Bundy received egd recall ltr-pt is due the end of the month  Wants to put off for another year due to covid   Please advise-thank you  2017 egd is scanned in

## 2020-08-03 NOTE — TELEPHONE ENCOUNTER
Biopsies from EGD 3 years ago were negative for intestinal metaplasia    I am okay with her waiting until next July for her follow-up EGD

## 2020-08-05 ENCOUNTER — TELEPHONE (OUTPATIENT)
Dept: FAMILY MEDICINE CLINIC | Facility: CLINIC | Age: 45
End: 2020-08-05

## 2020-08-05 NOTE — TELEPHONE ENCOUNTER
Left message on personal voicemail for patient that we are cancelling today's appointment due to power outage and for her to call back and reschedule at a later date

## 2020-09-17 ENCOUNTER — TELEPHONE (OUTPATIENT)
Dept: GASTROENTEROLOGY | Facility: CLINIC | Age: 45
End: 2020-09-17

## 2021-05-27 DIAGNOSIS — K21.9 GASTROESOPHAGEAL REFLUX DISEASE: ICD-10-CM

## 2021-05-27 RX ORDER — ESOMEPRAZOLE MAGNESIUM 40 MG/1
CAPSULE, DELAYED RELEASE ORAL
Qty: 90 CAPSULE | Refills: 5 | Status: SHIPPED | OUTPATIENT
Start: 2021-05-27 | End: 2022-08-10 | Stop reason: SDUPTHER

## 2022-08-09 DIAGNOSIS — K21.9 GASTROESOPHAGEAL REFLUX DISEASE: ICD-10-CM

## 2022-08-10 RX ORDER — ESOMEPRAZOLE MAGNESIUM 40 MG/1
CAPSULE, DELAYED RELEASE ORAL
Qty: 90 CAPSULE | Refills: 5 | Status: SHIPPED | OUTPATIENT
Start: 2022-08-10 | End: 2022-09-26 | Stop reason: SDUPTHER

## 2022-09-26 ENCOUNTER — OFFICE VISIT (OUTPATIENT)
Dept: GASTROENTEROLOGY | Facility: CLINIC | Age: 47
End: 2022-09-26
Payer: COMMERCIAL

## 2022-09-26 VITALS
DIASTOLIC BLOOD PRESSURE: 80 MMHG | BODY MASS INDEX: 27.97 KG/M2 | HEIGHT: 62 IN | SYSTOLIC BLOOD PRESSURE: 118 MMHG | WEIGHT: 152 LBS

## 2022-09-26 DIAGNOSIS — K21.9 GASTROESOPHAGEAL REFLUX DISEASE WITHOUT ESOPHAGITIS: Primary | ICD-10-CM

## 2022-09-26 DIAGNOSIS — K21.9 GASTROESOPHAGEAL REFLUX DISEASE: ICD-10-CM

## 2022-09-26 DIAGNOSIS — Z12.11 SCREENING FOR COLON CANCER: ICD-10-CM

## 2022-09-26 PROCEDURE — 99204 OFFICE O/P NEW MOD 45 MIN: CPT | Performed by: NURSE PRACTITIONER

## 2022-09-26 RX ORDER — MONTELUKAST SODIUM 10 MG/1
10 TABLET ORAL
COMMUNITY

## 2022-09-26 RX ORDER — ESOMEPRAZOLE MAGNESIUM 40 MG/1
40 CAPSULE, DELAYED RELEASE ORAL DAILY
Qty: 90 CAPSULE | Refills: 3 | Status: SHIPPED | OUTPATIENT
Start: 2022-09-26

## 2022-09-26 RX ORDER — TRIAMCINOLONE ACETONIDE 55 UG/1
2 SPRAY, METERED NASAL DAILY
COMMUNITY

## 2022-09-26 NOTE — PROGRESS NOTES
[facility] - Outpatient Consultation  Orly Melissa 51 y o  female MRN: [id number]  Encounter: [id number]    ASSESSMENT AND PLAN:      1  Gastroesophageal reflux disease without esophagitis  2  Remote history of Barretts esophagus  Longstanding history of GERD and reports remote history of Barretts  EGD 2017 with irregular Z-line, biopsies negative for H pylori and Barretts  Symptoms currently well controlled on esomeprazole but reports breakthrough symptoms with missing 1 dose  Experiences dyspepsia  No alarm symptoms  -continue Nexium 40 mg daily  Discussed possible weaning -would recommend decreasing to 20 mg daily if patient feels that she wants to proceed  -reviewed GERD diet and lifestyle modification      3  Screening for colon cancer  Due for surveillance-no prior initial screening colonoscopy  Patient concerned about transportation-she lives 1 hour away and does not have   Average risk for colon cancer  -Cologuard screening ordered  Patient is aware that if it is positive she will need to proceed with colonoscopy      Followup Appointment:  1 year  ______________________________________________________________________    Chief Complaint   Patient presents with    Follow up-GERD       HPI:   Orly Melissa is a 51y o  year old female with longstanding history of GERD  She does report history of Barretts esophagus on remote EGD  Most recent EGD in 2017 showed irregular Z-line however biopsies were negative for Barretts and H pylori  She currently takes esomeprazole 40 mg daily with good control of her reflux symptoms  She does report she experiences breakthrough symptoms with missing 1 dose -described dyspepsia but denies esophageal discomfort or liquid reflux  Denies recent nausea or vomiting  Her appetite is good and her weight is stable    No recent change in bowel habits -reports formed bowel movement daily    No recent melena or rectal bleeding    No prior screening colonoscopy-discussed proceeding with colonoscopy in the near future however patient lives approximately 1 hour away and is concerned about arranging transportation  Average risk for colon cancer-denies family history of colon cancer  Discussed Cologuard screening and patient is agreeable  Patient is aware that if this is positive she will need to proceed with colonoscopy for further evaluation    Historical Information   Past Medical History:   Diagnosis Date    Allergic rhinitis     Anemia 2013    Asthma     Hypothyroidism     Resolved 2017     Mitral valve prolapse     Postoperative hypothyroidism      Past Surgical History:   Procedure Laterality Date    APPENDECTOMY       SECTION       Social History     Substance and Sexual Activity   Alcohol Use Not Currently    Comment: social     Social History     Substance and Sexual Activity   Drug Use Never     Social History     Tobacco Use   Smoking Status Never Smoker   Smokeless Tobacco Never Used     Family History   Problem Relation Age of Onset    Emphysema Mother     Diabetes Father     Cancer Father        Meds/Allergies     Current Outpatient Medications:     atenolol (TENORMIN) 25 mg tablet    esomeprazole (NexIUM) 40 MG capsule    fluticasone-umeclidinium-vilanterol (Trelegy Ellipta) 100-62 5-25 MCG/INH inhaler    montelukast (SINGULAIR) 10 mg tablet    SYNTHROID 88 MCG tablet    Triamcinolone Acetonide (Nasacort Allergy 24HR) 55 MCG/ACT nasal spray    Olopatadine HCl 0 6 % SOLN    Allergies   Allergen Reactions    Latex Rash    Peanut Oil - Food Allergy Other (See Comments)     dizzy    Soy Isoflavones Other (See Comments)     dizzy       PHYSICAL EXAM:    Blood pressure 118/80, height 5' 2" (1 575 m), weight 68 9 kg (152 lb), not currently breastfeeding  Body mass index is 27 8 kg/m²    General Appearance: NAD, cooperative, alert  Eyes: Anicteric  ENT:  Normocephalic, atraumatic, normal mucosa  Neck:  Supple, symmetrical, trachea midline,   Resp:  Clear to auscultation bilaterally; no rales, rhonchi or wheezing; respirations unlabored   CV:  S1 S2, Regular rate and rhythm; no murmur, rub, or gallop  GI:  Soft, non-tender, non-distended; normal bowel sounds; no masses, no organomegaly   Rectal: Deferred  Musculoskeletal: No cyanosis, clubbing or edema  Normal ROM  Skin:  No jaundice, rashes, or lesions   Psych: Normal affect, good eye contact  Neuro: No gross deficits, AAOx3    Lab Results:   Lab Results   Component Value Date    WBC 6 8 07/28/2020    HGB 13 5 07/28/2020    HCT 40 6 07/28/2020    MCV 89 07/28/2020     07/28/2020     Lab Results   Component Value Date    K 4 4 07/28/2020     07/28/2020    CO2 24 07/28/2020    BUN 12 07/28/2020    CREATININE 0 72 07/28/2020     Lab Results   Component Value Date    IRON 95 07/28/2020    TIBC 409 07/28/2020    FERRITIN 14 (L) 07/28/2020           REVIEW OF SYSTEMS:    CONSTITUTIONAL: Denies any fever, chills, rigors, and weight loss  HEENT: No earache or tinnitus  Denies hearing loss or visual disturbances  CARDIOVASCULAR: No chest pain or palpitations  RESPIRATORY: Denies any cough, hemoptysis, shortness of breath or dyspnea on exertion  GASTROINTESTINAL: As noted in the History of Present Illness  GENITOURINARY: No problems with urination  Denies any hematuria or dysuria  NEUROLOGIC: No dizziness or vertigo, denies headaches  MUSCULOSKELETAL: Denies any muscle or joint pain  SKIN: Denies skin rashes or itching  ENDOCRINE: Denies excessive thirst  Denies intolerance to heat or cold  PSYCHOSOCIAL: Denies depression or anxiety  Denies any recent memory loss

## 2022-12-01 ENCOUNTER — OFFICE VISIT (OUTPATIENT)
Dept: URGENT CARE | Facility: CLINIC | Age: 47
End: 2022-12-01

## 2022-12-01 VITALS
RESPIRATION RATE: 18 BRPM | SYSTOLIC BLOOD PRESSURE: 108 MMHG | BODY MASS INDEX: 27.6 KG/M2 | OXYGEN SATURATION: 99 % | DIASTOLIC BLOOD PRESSURE: 70 MMHG | TEMPERATURE: 98.5 F | HEART RATE: 65 BPM | WEIGHT: 150 LBS | HEIGHT: 62 IN

## 2022-12-01 DIAGNOSIS — R39.9 UTI SYMPTOMS: Primary | ICD-10-CM

## 2022-12-01 RX ORDER — SULFAMETHOXAZOLE AND TRIMETHOPRIM 800; 160 MG/1; MG/1
1 TABLET ORAL EVERY 12 HOURS SCHEDULED
Qty: 6 TABLET | Refills: 0 | Status: SHIPPED | COMMUNITY
Start: 2022-12-01 | End: 2022-12-04

## 2022-12-01 NOTE — PATIENT INSTRUCTIONS
Urinary Tract Infection in Women   WHAT YOU NEED TO KNOW:   A urinary tract infection (UTI) is caused by bacteria that get inside your urinary tract  Most bacteria that enter your urinary tract come out when you urinate  If the bacteria stay in your urinary tract, you may get an infection  Your urinary tract includes your kidneys, ureters, bladder, and urethra  Urine is made in your kidneys, and it flows from the ureters to the bladder  Urine leaves the bladder through the urethra  A UTI is more common in your lower urinary tract, which includes your bladder and urethra  DISCHARGE INSTRUCTIONS:   Return to the emergency department if:   You are urinating very little or not at all  You have a high fever with shaking chills  You have side or back pain that gets worse  Call your doctor if:   You have a fever  You do not feel better after 2 days of taking antibiotics  You are vomiting  You have questions or concerns about your condition or care  Medicines:   Antibiotics  help fight a bacterial infection  If you have UTIs often (called recurrent UTIs), you may be given antibiotics to take regularly  You will be given directions for when and how to use antibiotics  The goal is to prevent UTIs but not cause antibiotic resistance by using antibiotics too often  Medicines  may be given to decrease pain and burning when you urinate  They will also help decrease the feeling that you need to urinate often  These medicines will make your urine orange or red  Take your medicine as directed  Contact your healthcare provider if you think your medicine is not helping or if you have side effects  Tell him or her if you are allergic to any medicine  Keep a list of the medicines, vitamins, and herbs you take  Include the amounts, and when and why you take them  Bring the list or the pill bottles to follow-up visits  Carry your medicine list with you in case of an emergency      Follow up with your doctor as directed:  Write down your questions so you remember to ask them during your visits  Prevent another UTI:   Empty your bladder often  Urinate and empty your bladder as soon as you feel the need  Do not hold your urine for long periods of time  Wipe from front to back after you urinate or have a bowel movement  This will help prevent germs from getting into your urinary tract through your urethra  Drink liquids as directed  Ask how much liquid to drink each day and which liquids are best for you  You may need to drink more liquids than usual to help flush out the bacteria  Do not drink alcohol, caffeine, or citrus juices  These can irritate your bladder and increase your symptoms  Your healthcare provider may recommend cranberry juice to help prevent a UTI  Urinate after you have sex  This can help flush out bacteria passed during sex  Do not douche or use feminine deodorants  These can change the chemical balance in your vagina  Change sanitary pads or tampons often  This will help prevent germs from getting into your urinary tract  Talk to your healthcare provider about your birth control method  You may need to change your method if it is increasing your risk for UTIs  Wear cotton underwear and clothes that are loose  Tight pants and nylon underwear can trap moisture and cause bacteria to grow  Vaginal estrogen may be recommended  This medicine helps prevent UTIs in women who have gone through menopause or are in cali-menopause  Do pelvic muscle exercises often  Pelvic muscle exercises may help you start and stop urinating  Strong pelvic muscles may help you empty your bladder easier  Squeeze these muscles tightly for 5 seconds like you are trying to hold back urine  Then relax for 5 seconds  Gradually work up to squeezing for 10 seconds  Do 3 sets of 15 repetitions a day, or as directed      © Copyright [employer] 2022 Information is for End User's use only and may not be sold, redistributed or otherwise used for commercial purposes  All illustrations and images included in CareNotes® are the copyrighted property of [employer]  or Sarah Martinez  The above information is an  only  It is not intended as medical advice for individual conditions or treatments  Talk to your doctor, nurse or pharmacist before following any medical regimen to see if it is safe and effective for you  UTI:   -The Urinalysis was unable to be read to due to the AZO,  will send out for culture  Call in the next 48 hours for your results  If negative culture we discussed d/c the antibiotic  Will treat empirically with Bactrim DS taken as directed  Take with food and a probiotic    -Stay very well hydrated and push fluids  You can drink low sugar cranberry juice diluted with water    -You can take Uricalm or Azo for your symptoms not to exceed 48 hours  -If your symptoms worsen or persist follow up with your PCP/OBGYN immediately for a recheck

## 2022-12-01 NOTE — PROGRESS NOTES
[facility]        NAME: Alexx Moore is a 49 y o  female  : 1975    MRN: [id number]  DATE: 2022  TIME: 9:24 AM    Assessment and Plan   UTI symptoms [R39 9]  1  UTI symptoms  Urine culture    CANCELED: POCT urine dip            Patient Instructions   UTI:   -The Urinalysis was unable to be read to due to the AZO,  will send out for culture  Call in the next 48 hours for your results  If negative culture we discussed d/c the antibiotic  Will treat empirically with Bactrim DS taken as directed  Take with food and a probiotic    -Stay very well hydrated and push fluids  You can drink low sugar cranberry juice diluted with water    -You can take Uricalm or Azo for your symptoms not to exceed 48 hours  -If your symptoms worsen or persist follow up with your PCP/OBGYN immediately for a recheck  Follow up with PCP in 3-5 days  Proceed to  ER if symptoms worsen  Chief Complaint     Chief Complaint   Patient presents with   • Possible UTI     Pt here for concerns of a UTI  pt states s/s x2 days burning after voiding,  freq, and urgency  Pt used Azo  History of Present Illness       The patient presents today for a two day hx urinary frequency, urgency and dysuria  She states that last week she was treated for vaginal candidiasis with one dose of the fluconazole which cleared her symptoms until two days ago  She states that last night she noticed scant hematuria after she urinated  She no longer has itching or increased discharge  She has no fever, chills, body aches  No nausea, vomiting, diarrhea  No abdominal pain, flank pain or pelvic pain  No dizziness or weakness  She has been taking AZO for her symptoms  Review of Systems   Review of Systems   Constitutional: Negative for activity change, appetite change, chills, fatigue and fever  Respiratory: Negative for cough, chest tightness, shortness of breath and wheezing      Cardiovascular: Negative for chest pain and palpitations  Gastrointestinal: Negative for abdominal distention, abdominal pain, blood in stool, constipation, diarrhea, nausea and vomiting  Genitourinary: Positive for dysuria, frequency and urgency  Negative for decreased urine volume, flank pain, hematuria, pelvic pain, vaginal bleeding, vaginal discharge and vaginal pain  Musculoskeletal: Negative for arthralgias and myalgias  Skin: Negative for rash  Hematological: Negative for adenopathy  Does not bruise/bleed easily           Current Medications       Current Outpatient Medications:   •  atenolol (TENORMIN) 25 mg tablet, Take 50 mg by mouth, Disp: , Rfl:   •  esomeprazole (NexIUM) 40 MG capsule, Take 1 capsule (40 mg total) by mouth daily, Disp: 90 capsule, Rfl: 3  •  fluticasone-umeclidinium-vilanterol (Trelegy Ellipta) 100-62 5-25 MCG/INH inhaler, Inhale 1 puff daily Rinse mouth after use , Disp: , Rfl:   •  montelukast (SINGULAIR) 10 mg tablet, Take 10 mg by mouth daily at bedtime, Disp: , Rfl:   •  SYNTHROID 88 MCG tablet, Take 88 mcg by mouth daily, Disp: , Rfl: 0  •  Triamcinolone Acetonide (Nasacort Allergy) 55 MCG/ACT nasal spray, 2 sprays by Each Nare route daily, Disp: , Rfl:     Current Allergies     Allergies as of 12/01/2022 - Reviewed 12/01/2022   Allergen Reaction Noted   • Isoflavones (soy) Other (See Comments) 12/30/2012   • Latex Rash 12/30/2012   • Peanut oil - food allergy Other (See Comments) 12/30/2012            The following portions of the patient's history were reviewed and updated as appropriate: allergies, current medications, past family history, past medical history, past social history, past surgical history and problem list      Past Medical History:   Diagnosis Date   • Allergic rhinitis    • Anemia 1/2/2013   • Asthma    • Hypothyroidism     Resolved 2/25/2017    • Mitral valve prolapse    • Postoperative hypothyroidism        Past Surgical History:   Procedure Laterality Date   • APPENDECTOMY     •  SECTION         Family History   Problem Relation Age of Onset   • Emphysema Mother    • Diabetes Father    • Cancer Father          Medications have been verified  Objective   /70   Pulse 65   Temp 98 5 °F (36 9 °C) (Tympanic)   Resp 18   Ht 5' 2" (1 575 m)   Wt 68 kg (150 lb)   SpO2 99%   BMI 27 44 kg/m²   No LMP recorded  Physical Exam     Physical Exam  Vitals and nursing note reviewed  Constitutional:       General: She is not in acute distress  Appearance: Normal appearance  She is well-developed and well-nourished  She is not diaphoretic  Cardiovascular:      Rate and Rhythm: Normal rate and regular rhythm  Heart sounds: Normal heart sounds  Pulmonary:      Effort: Pulmonary effort is normal       Breath sounds: Normal breath sounds  Abdominal:      General: Bowel sounds are normal  There is no distension  Palpations: Abdomen is soft  Abdomen is not rigid  There is no hepatosplenomegaly  Tenderness: There is no abdominal tenderness  There is no CVA tenderness, right CVA tenderness, left CVA tenderness, guarding or rebound  Negative signs include Barker's sign and McBurney's sign  Hernia: No hernia is present  Skin:     General: Skin is warm and dry  Capillary Refill: Capillary refill takes less than 2 seconds     Psychiatric:         Mood and Affect: Mood and affect normal          Behavior: Behavior normal

## 2022-12-05 LAB
BACTERIA UR CULT: ABNORMAL
Lab: ABNORMAL
SL AMB ANTIMICROBIAL SUSCEPTIBILITY: ABNORMAL

## 2023-12-22 DIAGNOSIS — K21.9 GASTROESOPHAGEAL REFLUX DISEASE: ICD-10-CM

## 2023-12-22 RX ORDER — ESOMEPRAZOLE MAGNESIUM 40 MG/1
40 CAPSULE, DELAYED RELEASE ORAL DAILY
Qty: 90 CAPSULE | Refills: 0 | Status: SHIPPED | OUTPATIENT
Start: 2023-12-22

## 2024-02-21 PROBLEM — H60.502 ACUTE OTITIS EXTERNA OF LEFT EAR: Status: RESOLVED | Noted: 2018-07-11 | Resolved: 2024-02-21

## 2024-05-06 ENCOUNTER — OFFICE VISIT (OUTPATIENT)
Dept: URGENT CARE | Facility: CLINIC | Age: 49
End: 2024-05-06
Payer: COMMERCIAL

## 2024-05-06 VITALS
TEMPERATURE: 98.1 F | BODY MASS INDEX: 28.89 KG/M2 | WEIGHT: 157 LBS | SYSTOLIC BLOOD PRESSURE: 136 MMHG | HEART RATE: 64 BPM | OXYGEN SATURATION: 98 % | RESPIRATION RATE: 16 BRPM | DIASTOLIC BLOOD PRESSURE: 82 MMHG | HEIGHT: 62 IN

## 2024-05-06 DIAGNOSIS — R39.9 UTI SYMPTOMS: Primary | ICD-10-CM

## 2024-05-06 PROBLEM — Z71.85 VACCINE COUNSELING: Status: ACTIVE | Noted: 2021-02-15

## 2024-05-06 PROBLEM — J32.9 RECURRENT SINUSITIS: Status: ACTIVE | Noted: 2020-04-15

## 2024-05-06 PROBLEM — J45.40 MODERATE PERSISTENT ASTHMA WITHOUT COMPLICATION: Status: ACTIVE | Noted: 2020-07-22

## 2024-05-06 PROBLEM — E55.9 VITAMIN D DEFICIENCY: Status: ACTIVE | Noted: 2020-08-24

## 2024-05-06 LAB
SL AMB  POCT GLUCOSE, UA: ABNORMAL
SL AMB LEUKOCYTE ESTERASE,UA: ABNORMAL
SL AMB POCT BILIRUBIN,UA: ABNORMAL
SL AMB POCT BLOOD,UA: ABNORMAL
SL AMB POCT CLARITY,UA: YELLOW
SL AMB POCT COLOR,UA: YELLOW
SL AMB POCT KETONES,UA: ABNORMAL
SL AMB POCT NITRITE,UA: ABNORMAL
SL AMB POCT PH,UA: 7
SL AMB POCT SPECIFIC GRAVITY,UA: 1
SL AMB POCT URINE PROTEIN: ABNORMAL
SL AMB POCT UROBILINOGEN: 0.2

## 2024-05-06 PROCEDURE — 99213 OFFICE O/P EST LOW 20 MIN: CPT | Performed by: FAMILY MEDICINE

## 2024-05-06 PROCEDURE — 81002 URINALYSIS NONAUTO W/O SCOPE: CPT | Performed by: FAMILY MEDICINE

## 2024-05-06 RX ORDER — CEPHALEXIN 500 MG/1
500 CAPSULE ORAL EVERY 12 HOURS SCHEDULED
Qty: 14 CAPSULE | Refills: 0 | Status: SHIPPED | OUTPATIENT
Start: 2024-05-06 | End: 2024-05-13

## 2024-05-06 RX ORDER — NITROFURANTOIN 25; 75 MG/1; MG/1
CAPSULE ORAL
COMMUNITY
Start: 2024-04-27

## 2024-05-06 RX ORDER — NYSTATIN AND TRIAMCINOLONE ACETONIDE 100000; 1 [USP'U]/G; MG/G
OINTMENT TOPICAL 2 TIMES DAILY PRN
COMMUNITY
Start: 2024-03-27

## 2024-05-06 NOTE — PROGRESS NOTES
[facility]        NAME: Alex Hale is a 48 y.o. female  : 1975    MRN: [id number]  DATE: May 6, 2024  TIME: 10:59 AM    Assessment and Plan   UTI symptoms [R39.9]  1. UTI symptoms  POCT urine dip    Urine culture    Urine culture    cephalexin (KEFLEX) 500 mg capsule            Patient Instructions     Patient Instructions   UTI symptoms  - urine dip test performed in office today shows trace blood and leukocytes   - urine sample sent for culture testing, patient has been instructed to call the office in 2-3 days to follow up culture results.   - Keflex x 7 days prescribed, after discussion with patient, Keflex is to be started if urine culture returns positive and organism is susceptible to the medication, will connect with patient when culture results available.  - may take AZO as needed   - may take Tylenol or Motrin as needed for discomfort   - patient is to rest and drink plenty of fluids  - follow up w/ PCP or GYN office for re-check in 3-5 days   - if symptoms persist despite treatment, worsen, or any new symptoms present, patient is to be seen in the ER    - it was discussed with the patient that we are unable to perform pelvic exams or test for vaginal infections in our office. I have advised the patient to follow up with her PCP or GYN office for further evaluation/treatment for her concern for BV.     Follow up with PCP in 3-5 days.  Proceed to  ER if symptoms worsen.    If tests have been performed at [facility], our office will contact you with results if changes need to be made to the care plan discussed with you at the visit.  [facility]    Chief Complaint     Chief Complaint   Patient presents with    Possible UTI     Pt states that she had a UTI 1 week ago, did a 4 day course of Macrobid. Burning returned yesterday and pt took Macrobid and azo. She then took flagyl because she thought she had BV. She states that burning is  much better.          History of Present Illness   47 yo female presents for a possible UTI. She is experiencing dysuria, and urinary urgency and frequency. Her symptoms began last week. She denies seeing any blood in the urine. The urine does not appear cloudy/discolored or have a strong odor. No vaginal bleeding or discharge. No vaginal itching or burning. No nausea/vomiting or diarrhea. No abdominal or pelvic pain. No flank pain. No fever/chills. She has taken AZO for the symptoms. Patient has no known antibiotic allergies. Urine dip performed in office today is positive for trace blood and leukocytes. Patient states she had a video visit with her PCP for the UTI last week and was prescribed Macrobid. She states she took 4 days of the Macrobid and felt better therefore she stopped it. However when her symptoms returned a few days later, she states she restarted the Macrobid, however notes no improvement in her symptoms since then. Patient states she feels her clinical presentation is similar to the when she had BV six years ago. She states she had a left over Rx for Flagyl in her medicine cabinet therefore she took 1 tablet of that overnight, and feels her symptoms are improved since then. Patient is requesting treatment for a possible BV infection vs UTI.     Review of Systems   Review of Systems   Constitutional: Negative.    Respiratory: Negative.     Cardiovascular: Negative.    Gastrointestinal: Negative.    Genitourinary:         As noted in HPI   Musculoskeletal: Negative.    Skin: Negative.    Allergic/Immunologic:        As noted in chart   Psychiatric/Behavioral: Negative.           Current Medications       Current Outpatient Medications:     atenolol (TENORMIN) 25 mg tablet, Take 50 mg by mouth, Disp: , Rfl:     cephalexin (KEFLEX) 500 mg capsule, Take 1 capsule (500 mg total) by mouth every 12 (twelve) hours for 7 days, Disp: 14 capsule, Rfl: 0    esomeprazole (NexIUM) 40 MG capsule, TAKE 1  "CAPSULE(40 MG) BY MOUTH DAILY, Disp: 90 capsule, Rfl: 0    fluticasone-umeclidinium-vilanterol (Trelegy Ellipta) 100-62.5-25 MCG/INH inhaler, Inhale 1 puff daily Rinse mouth after use., Disp: , Rfl:     montelukast (SINGULAIR) 10 mg tablet, Take 10 mg by mouth daily at bedtime, Disp: , Rfl:     nitrofurantoin (MACROBID) 100 mg capsule, , Disp: , Rfl:     nystatin-triamcinolone (MYCOLOG-II) ointment, Apply topically 2 (two) times a day as needed, Disp: , Rfl:     SYNTHROID 88 MCG tablet, Take 88 mcg by mouth daily, Disp: , Rfl: 0    Triamcinolone Acetonide (Nasacort Allergy) 55 MCG/ACT nasal spray, 2 sprays by Each Nare route daily, Disp: , Rfl:     Current Allergies     Allergies as of 2024 - Reviewed 2024   Allergen Reaction Noted    Isoflavones (soy) Other (See Comments) 2012    Latex Rash 2012    Peanut oil - food allergy Other (See Comments) 2012            The following portions of the patient's history were reviewed and updated as appropriate: allergies, current medications, past family history, past medical history, past social history, past surgical history and problem list.     Past Medical History:   Diagnosis Date    Allergic rhinitis     Anemia 2013    Asthma     Hypothyroidism     Resolved 2017     Mitral valve prolapse     Postoperative hypothyroidism        Past Surgical History:   Procedure Laterality Date    APPENDECTOMY       SECTION         Family History   Problem Relation Age of Onset    Emphysema Mother     Diabetes Father     Cancer Father          Medications have been verified.        Objective   /82   Pulse 64   Temp 98.1 °F (36.7 °C)   Resp 16   Ht 5' 2\" (1.575 m)   Wt 71.2 kg (157 lb)   SpO2 98%   BMI 28.72 kg/m²   No LMP recorded.       Physical Exam     Physical Exam  Vitals and nursing note reviewed.   Constitutional:       General: She is awake. She is not in acute distress.     Appearance: Normal appearance. She is " well-developed and well-groomed. She is not ill-appearing, toxic-appearing or diaphoretic.   Cardiovascular:      Rate and Rhythm: Normal rate and regular rhythm.      Pulses: Normal pulses.      Heart sounds: Normal heart sounds.   Pulmonary:      Effort: Pulmonary effort is normal. No tachypnea, accessory muscle usage or respiratory distress.      Breath sounds: Normal breath sounds and air entry.   Abdominal:      General: Abdomen is flat. There is no distension.      Palpations: Abdomen is soft. There is no mass.      Tenderness: There is no abdominal tenderness. There is no right CVA tenderness, left CVA tenderness, guarding or rebound.      Hernia: No hernia is present.   Skin:     General: Skin is warm and dry.      Capillary Refill: Capillary refill takes less than 2 seconds.      Coloration: Skin is not pale.   Neurological:      Mental Status: She is alert and oriented to person, place, and time. Mental status is at baseline.   Psychiatric:         Mood and Affect: Mood normal.         Behavior: Behavior normal. Behavior is cooperative.         Thought Content: Thought content normal.         Judgment: Judgment normal.

## 2024-05-06 NOTE — PATIENT INSTRUCTIONS
UTI symptoms  - urine dip test performed in office today shows trace blood and leukocytes   - urine sample sent for culture testing, patient has been instructed to call the office in 2-3 days to follow up culture results.   - Keflex x 7 days prescribed, after discussion with patient, Keflex is to be started if urine culture returns positive and organism is susceptible to the medication, will connect with patient when culture results available.  - may take AZO as needed   - may take Tylenol or Motrin as needed for discomfort   - patient is to rest and drink plenty of fluids  - follow up w/ PCP or GYN office for re-check in 3-5 days   - if symptoms persist despite treatment, worsen, or any new symptoms present, patient is to be seen in the ER    - it was discussed with the patient that we are unable to perform pelvic exams or test for vaginal infections in our office. I have advised the patient to follow up with her PCP or GYN office for further evaluation/treatment for her concern for BV.

## 2024-05-08 LAB
BACTERIA UR CULT: NORMAL
Lab: NORMAL

## 2024-06-04 DIAGNOSIS — K21.9 GASTROESOPHAGEAL REFLUX DISEASE: ICD-10-CM

## 2024-06-04 RX ORDER — ESOMEPRAZOLE MAGNESIUM 40 MG/1
40 CAPSULE, DELAYED RELEASE ORAL DAILY
Qty: 30 CAPSULE | Refills: 0 | Status: SHIPPED | OUTPATIENT
Start: 2024-06-04

## 2024-06-05 PROBLEM — J32.9 RECURRENT SINUSITIS: Status: RESOLVED | Noted: 2020-04-15 | Resolved: 2024-06-05

## 2025-04-14 ENCOUNTER — OFFICE VISIT (OUTPATIENT)
Dept: OBGYN CLINIC | Facility: CLINIC | Age: 50
End: 2025-04-14
Payer: COMMERCIAL

## 2025-04-14 ENCOUNTER — APPOINTMENT (OUTPATIENT)
Dept: RADIOLOGY | Facility: CLINIC | Age: 50
End: 2025-04-14
Attending: ORTHOPAEDIC SURGERY
Payer: COMMERCIAL

## 2025-04-14 VITALS — WEIGHT: 157 LBS | HEIGHT: 62 IN | BODY MASS INDEX: 28.89 KG/M2

## 2025-04-14 DIAGNOSIS — M25.561 RIGHT KNEE PAIN, UNSPECIFIED CHRONICITY: ICD-10-CM

## 2025-04-14 DIAGNOSIS — Z01.89 ENCOUNTER FOR LOWER EXTREMITY COMPARISON IMAGING STUDY: ICD-10-CM

## 2025-04-14 DIAGNOSIS — S83.411A SPRAIN OF MEDIAL COLLATERAL LIGAMENT OF RIGHT KNEE, INITIAL ENCOUNTER: Primary | ICD-10-CM

## 2025-04-14 PROCEDURE — 73560 X-RAY EXAM OF KNEE 1 OR 2: CPT

## 2025-04-14 PROCEDURE — 99203 OFFICE O/P NEW LOW 30 MIN: CPT | Performed by: ORTHOPAEDIC SURGERY

## 2025-04-14 PROCEDURE — 73562 X-RAY EXAM OF KNEE 3: CPT

## 2025-04-14 NOTE — PROGRESS NOTES
Assessment/Plan:  1. Sprain of medial collateral ligament of right knee, initial encounter  XR knee 3 vw right non injury    Brace      2. Encounter for lower extremity comparison imaging study  XR knee 1 or 2 vw left          Fern has right-sided knee pain with discomfort over the medial aspect of the knee along the MCL.  She does not have significant joint line tenderness other than at the MCL region and pain with valgus stress test.  She likely strained the MCL walking at some point 2 weeks ago and I recommended a hinged knee brace and home exercises.  This should resolve over 3 to 4 weeks.  Follow-up in 3 weeks for repeat evaluation if pain persist further imaging may be warranted.      Subjective:   Alex Hale is a 49 y.o. female who presents to the office for right-sided knee pain.  She denies any injury or trauma but has been feeling discomfort over the medial aspect of the knee for the last 2 weeks.  The pain began just after going for a long walk/hike.  She felt increased pain to the medial portion of the knee but no swelling or locking or catching.  She denies any mechanical symptoms in the knee.  She denies any history of knee issue in the past.  She has tried resting for the last 2 weeks and taking anti-inflammatories which have not helped significantly.      Review of Systems   Constitutional:  Negative for chills, fever and unexpected weight change.   HENT:  Negative for hearing loss, nosebleeds and sore throat.    Eyes:  Negative for pain, redness and visual disturbance.   Respiratory:  Negative for cough, shortness of breath and wheezing.    Cardiovascular:  Negative for chest pain, palpitations and leg swelling.   Gastrointestinal:  Negative for abdominal pain, nausea and vomiting.   Endocrine: Negative for polydipsia and polyuria.   Genitourinary:  Negative for dysuria and hematuria.   Musculoskeletal:         See HPI   Skin:  Negative for rash and wound.   Neurological:  Negative  for dizziness, numbness and headaches.   Psychiatric/Behavioral:  Negative for decreased concentration and suicidal ideas. The patient is not nervous/anxious.          Past Medical History:   Diagnosis Date    Allergic rhinitis     Anemia 2013    Asthma     Hypothyroidism     Resolved 2017     Mitral valve prolapse     Postoperative hypothyroidism        Past Surgical History:   Procedure Laterality Date    APPENDECTOMY       SECTION         Family History   Problem Relation Age of Onset    Emphysema Mother     Diabetes Father     Cancer Father        Social History     Occupational History    Not on file   Tobacco Use    Smoking status: Never    Smokeless tobacco: Never   Vaping Use    Vaping status: Never Used   Substance and Sexual Activity    Alcohol use: Not Currently     Comment: social    Drug use: Never    Sexual activity: Not Currently         Current Outpatient Medications:     atenolol (TENORMIN) 25 mg tablet, Take 50 mg by mouth, Disp: , Rfl:     esomeprazole (NexIUM) 40 MG capsule, Take 1 capsule (40 mg total) by mouth daily, Disp: 30 capsule, Rfl: 0    fluticasone-umeclidinium-vilanterol (Trelegy Ellipta) 100-62.5-25 MCG/INH inhaler, Inhale 1 puff daily Rinse mouth after use., Disp: , Rfl:     montelukast (SINGULAIR) 10 mg tablet, Take 10 mg by mouth daily at bedtime, Disp: , Rfl:     nitrofurantoin (MACROBID) 100 mg capsule, , Disp: , Rfl:     nystatin-triamcinolone (MYCOLOG-II) ointment, Apply topically 2 (two) times a day as needed, Disp: , Rfl:     SYNTHROID 88 MCG tablet, Take 88 mcg by mouth daily, Disp: , Rfl: 0    Triamcinolone Acetonide (Nasacort Allergy) 55 MCG/ACT nasal spray, 2 sprays by Each Nare route daily, Disp: , Rfl:     Allergies   Allergen Reactions    Isoflavones (Soy) Other (See Comments)     dizzy    Latex Rash    Peanut Oil - Food Allergy Other (See Comments)     dizzy       Objective:  There were no vitals filed for this visit.         Right Knee Exam      Tenderness   The patient is experiencing tenderness in the MCL and medial retinaculum.    Range of Motion   Extension:  normal   Flexion:  normal     Tests   Amaris:  Medial - negative Lateral - negative  Varus: negative Valgus: positive    Other   Erythema: absent  Sensation: normal  Pulse: present  Swelling: none  Effusion: no effusion present          Observations     Right Knee   Negative for effusion.       Physical Exam  Vitals and nursing note reviewed.   Constitutional:       Appearance: Normal appearance. She is well-developed.   HENT:      Head: Normocephalic and atraumatic.      Right Ear: External ear normal.      Left Ear: External ear normal.   Eyes:      General: No scleral icterus.     Extraocular Movements: Extraocular movements intact.      Conjunctiva/sclera: Conjunctivae normal.   Cardiovascular:      Rate and Rhythm: Normal rate.   Pulmonary:      Effort: Pulmonary effort is normal. No respiratory distress.   Musculoskeletal:      Cervical back: Normal range of motion and neck supple.      Right knee: No effusion.      Instability Tests: Medial Amaris test negative and lateral Amaris test negative.      Comments: See Ortho exam   Skin:     General: Skin is warm and dry.   Neurological:      General: No focal deficit present.      Mental Status: She is alert and oriented to person, place, and time.   Psychiatric:         Behavior: Behavior normal.         I have personally reviewed pertinent films in PACS and my interpretation is as follows:  Right knee x-rays demonstrate no evidence of acute fracture or significant degenerative change.      This document was created using speech voice recognition software.   Grammatical errors, random word insertions, pronoun errors, and incomplete sentences are an occasional consequence of this system due to software limitations, ambient noise, and hardware issues.   Any formal questions or concerns about content, text, or information contained within  the body of this dictation should be directly addressed to the provider for clarification.